# Patient Record
Sex: FEMALE | Race: BLACK OR AFRICAN AMERICAN
[De-identification: names, ages, dates, MRNs, and addresses within clinical notes are randomized per-mention and may not be internally consistent; named-entity substitution may affect disease eponyms.]

---

## 2022-08-03 ENCOUNTER — HOSPITAL ENCOUNTER (OUTPATIENT)
Dept: HOSPITAL 97 - ER | Age: 52
Setting detail: OBSERVATION
LOS: 1 days | Discharge: HOME | End: 2022-08-04
Attending: INTERNAL MEDICINE | Admitting: INTERNAL MEDICINE
Payer: COMMERCIAL

## 2022-08-03 VITALS — BODY MASS INDEX: 59.7 KG/M2

## 2022-08-03 DIAGNOSIS — Z88.6: ICD-10-CM

## 2022-08-03 DIAGNOSIS — I10: ICD-10-CM

## 2022-08-03 DIAGNOSIS — R07.89: Primary | ICD-10-CM

## 2022-08-03 DIAGNOSIS — Z79.899: ICD-10-CM

## 2022-08-03 DIAGNOSIS — Z20.822: ICD-10-CM

## 2022-08-03 DIAGNOSIS — Z90.49: ICD-10-CM

## 2022-08-03 DIAGNOSIS — Z82.49: ICD-10-CM

## 2022-08-03 DIAGNOSIS — E11.40: ICD-10-CM

## 2022-08-03 DIAGNOSIS — E66.01: ICD-10-CM

## 2022-08-03 DIAGNOSIS — E87.6: ICD-10-CM

## 2022-08-03 LAB
BUN BLD-MCNC: 14 MG/DL (ref 7–18)
GLUCOSE SERPLBLD-MCNC: 110 MG/DL (ref 74–106)
HCT VFR BLD CALC: 38.3 % (ref 36–45)
LYMPHOCYTES # SPEC AUTO: 2.8 K/UL (ref 0.7–4.9)
MCV RBC: 87.2 FL (ref 80–100)
NT-PROBNP SERPL-MCNC: 15 PG/ML (ref ?–125)
PMV BLD: 9.5 FL (ref 7.6–11.3)
POTASSIUM SERPL-SCNC: 3.3 MMOL/L (ref 3.5–5.1)
RBC # BLD: 4.39 M/UL (ref 3.86–4.86)
TROPONIN I SERPL HS-MCNC: 3.7 PG/ML (ref ?–58.9)

## 2022-08-03 PROCEDURE — 80053 COMPREHEN METABOLIC PANEL: CPT

## 2022-08-03 PROCEDURE — 85025 COMPLETE CBC W/AUTO DIFF WBC: CPT

## 2022-08-03 PROCEDURE — 80048 BASIC METABOLIC PNL TOTAL CA: CPT

## 2022-08-03 PROCEDURE — 36415 COLL VENOUS BLD VENIPUNCTURE: CPT

## 2022-08-03 PROCEDURE — 93306 TTE W/DOPPLER COMPLETE: CPT

## 2022-08-03 PROCEDURE — 84443 ASSAY THYROID STIM HORMONE: CPT

## 2022-08-03 PROCEDURE — 99285 EMERGENCY DEPT VISIT HI MDM: CPT

## 2022-08-03 PROCEDURE — 83880 ASSAY OF NATRIURETIC PEPTIDE: CPT

## 2022-08-03 PROCEDURE — 85379 FIBRIN DEGRADATION QUANT: CPT

## 2022-08-03 PROCEDURE — 93005 ELECTROCARDIOGRAM TRACING: CPT

## 2022-08-03 PROCEDURE — 80061 LIPID PANEL: CPT

## 2022-08-03 PROCEDURE — 96375 TX/PRO/DX INJ NEW DRUG ADDON: CPT

## 2022-08-03 PROCEDURE — 84484 ASSAY OF TROPONIN QUANT: CPT

## 2022-08-03 PROCEDURE — 71045 X-RAY EXAM CHEST 1 VIEW: CPT

## 2022-08-03 PROCEDURE — 82947 ASSAY GLUCOSE BLOOD QUANT: CPT

## 2022-08-03 PROCEDURE — 84439 ASSAY OF FREE THYROXINE: CPT

## 2022-08-03 PROCEDURE — 96374 THER/PROPH/DIAG INJ IV PUSH: CPT

## 2022-08-03 NOTE — EDPHYS
Physician Documentation                                                                           

 [facility]                                                                 

Name: Rody San                                                                             

Age: 51 yrs                                                                                       

Sex: Female                                                                                       

: 1970                                                                                   

MRN: [id number]                                                                                   

Arrival Date: 2022                                                                          

Time: 20:13                                                                                       

Account#: [id number]                                                                            

Bed 18                                                                                            

Private MD:                                                                                       

ED Physician Bandar Zee                                                                         

HPI:                                                                                              

                                                                                             

20:34 This 51 yrs old Black Female presents to ER via Ambulatory with complaints of Chest     rn  

      Pain.                                                                                       

20:34 The patient or guardian reports chest pain that is located primarily in the anterior    rn  

      chest wall, left. Onset: 1 hour(s) ago. The pain does not radiate. Associated signs and     

      symptoms: Pertinent positives: shortness of breath, Pertinent negatives: abdominal          

      pain, cough, lower extremity swelling. The chest pain is described as squeezing.            

      Duration: The patient or guardian reports multiple episodes, that are intermittent.         

      Modifying factors: The symptoms are alleviated by nothing. the symptoms are aggravated      

      by deep breath.                                                                             

20:35 Severity of pain: At its worst the pain was moderate in the emergency department the    rn  

      pain has improved. The patient has not experienced similar symptoms in the past. The        

      patient has not recently seen a physician. Reports left sided chest pain at rest, had       

      eaten a tuna sandwich, sitting in recliner, denies hx of acid reflux. + family hx of        

      cardiac problems at her age. No recent fever or illness. Feels like pain gets worse         

      with deep breath. Denies abd pain..                                                         

                                                                                                  

OB/GYN:                                                                                           

20:18 LMP N/A - Post-menopause                                                                tw5 

                                                                                                  

Historical:                                                                                       

- Allergies:                                                                                      

20:18 stadol;                                                                                 tw5 

- PMHx:                                                                                           

20:18 Hypertensive disorder; pre-diabetic;                                                    tw 

- PSHx:                                                                                           

20:18 Appendectomy; Cholecystectomy;                                                          tw5 

                                                                                                  

- Immunization history:: Flu vaccine is up to date.                                               

- Social history:: Smoking status: Patient denies any tobacco usage or history of.                

- Family history:: not pertinent.                                                                 

- Hospitalizations: : No recent hospitalization is reported.                                      

                                                                                                  

                                                                                                  

ROS:                                                                                              

20:35 Constitutional: Negative for fever, chills, and weight loss, Eyes: Negative for injury, rn  

      pain, redness, and discharge, Cardiovascular: Negative for palpitations, and edema          

      Respiratory: Negative for cough, wheezing Abdomen/GI: Negative for abdominal pain,          

      nausea, vomiting, diarrhea, and constipation, MS/Extremity: Negative for injury and         

      deformity, Skin: Negative for injury, rash, and discoloration, Neuro: Negative for          

      headache, weakness, numbness, tingling, and seizure.                                        

                                                                                                  

Exam:                                                                                             

20:35 Constitutional:  This is a well developed, well nourished patient who is awake, alert,  rn  

      and in no acute distress. Head/Face:  Normocephalic, atraumatic. Neck:  Trachea             

      midline, no masses palpated Cardiovascular:  Regular rate and rhythm.  No pulse             

      deficits. Respiratory:  No increased work of breathing, no retractions or nasal             

      flaring. Abdomen/GI:  Soft, non-tender Skin:  Warm, dry MS/ Extremity:  Pulses equal,       

      no cyanosis.  Equal circumference.  Neuro:  Awake and alert, GCS 15                         

21:09 ECG was reviewed by the Attending Physician.                                            rn  

                                                                                                  

Vital Signs:                                                                                      

20:17  / 91; Pulse 72; Resp 18; Temp 97.3; Pulse Ox 97% ; Weight 167.38 kg; Height 5    tw5 

      ft. 6 in. (167.64 cm); Pain 9/10;                                                           

21:08  / 55; Pulse 68; Resp 13; Pulse Ox 100% ; Weight 167.38 kg; Height 5 ft. 6 in.    ja4 

      (167.64 cm); Pain 7/10;                                                                     

22:41 Pain 6/10;                                                                              ja4 

                                                                                             

01:44  / 63; Pulse 71; Resp 18; Pulse Ox 100% on R/A;                                   ja4 

                                                                                             

21:08 Body Mass Index 59.56 (167.38 kg, 167.64 cm)                                            ja4 

                                                                                                  

MDM:                                                                                              

                                                                                             

20:17 Patient medically screened.                                                             rn  

22:22 Differential diagnosis: acute myocardial infarction, acute pericarditis,                rn  

      costochondritis, esophagitis, gastritis, gastroesophageal reflux disease (GERD),            

      pericarditis, pleurisy, pneumothorax, pulmonary embolus. Data reviewed: vital signs,        

      nurses notes, lab test result(s), EKG, radiologic studies, plain films, and as a            

      result, I will admit patient. Counseling: I had a detailed discussion with the patient      

      and/or guardian regarding: the historical points, exam findings, and any diagnostic         

      results supporting the discharge/admit diagnosis, lab results, radiology results, the       

      need for further work-up and treatment in the hospital. Response to treatment: the          

      patient's symptoms have mildly improved after treatment, and as a result, I will admit      

      patient. Admission orders: after a detailed discussion of the patient's condition and       

      case, the admit orders are written by me.                                                   

                                                                                                  

                                                                                             

20:17 Order name: Basic Metabolic Panel; Complete Time: 22:22                                 rn  

                                                                                             

20:17 Order name: CBC with Diff; Complete Time: 22:47                                         rn  

                                                                                             

20:17 Order name: NT PRO-BNP; Complete Time: 22:22                                            rn  

                                                                                             

20:17 Order name: Troponin HS; Complete Time: 22:22                                           rn  

                                                                                             

20:31 Order name: D-Dimer; Complete Time: 23:16                                               rn  

                                                                                             

20:31 Order name: SARS-COV-2 RT PCR (Document "Date of Onset" if Symptomatic); Complete Time: rn  

      22:06                                                                                       

                                                                                             

20:17 Order name: XRAY Chest (1 view); Complete Time: 21:37                                   rn  

                                                                                             

05:15 Order name: CBC with Automated Diff                                                     EDMS

                                                                                             

05:28 Order name: Comprehensive Metabolic Panel                                               EDMS

                                                                                             

05:28 Order name: Troponin High Sensitivity                                                   EDMS

                                                                                             

05:28 Order name: Lipid Profile                                                               EDMS

                                                                                             

05:28 Order name: T4 Free                                                                     EDMS

                                                                                             

05:28 Order name: Thyroid Stimulating Hormone                                                 EDMS

                                                                                             

08:33 Order name: Glucose, Ancillary Testing                                                  EDMS

                                                                                             

20:17 Order name: EKG; Complete Time: 20:18                                                   rn  

                                                                                             

20:17 Order name: Cardiac monitoring; Complete Time: 21:30                                    rn  

                                                                                             

20:17 Order name: EKG - Nurse/Tech; Complete Time: 21:03                                      rn  

                                                                                             

20:17 Order name: IV Saline Lock; Complete Time: 21:59                                        rn  

                                                                                             

20:17 Order name: Labs collected and sent; Complete Time: 21:59                               rn  

                                                                                             

20:17 Order name: O2 Per Protocol; Complete Time: 21:02                                       rn  

                                                                                             

20:17 Order name: O2 Sat Monitoring; Complete Time: 21:02                                     rn  

                                                                                                  

EC:10 Rate is 68 beats/min. Rhythm is regular. QRS Axis is Normal. MT interval is normal. QRS rn  

      interval is normal. QT interval is normal. No Q waves. T waves are Normal. No ST            

      changes noted. Clinical impression: NSR w/ Non-specific ST/T Changes. Interpreted by        

      me. Reviewed by me.                                                                         

                                                                                                  

Administered Medications:                                                                         

21:34 Drug: GI Cocktail without Donnatal - (Maalox Suspension 30 ml, Lidocaine Liquid 2 % 15  ja4 

      ml) Route: PO;                                                                              

                                                                                             

08:30 Follow up: Response: No adverse reaction                                                [id number] 

                                                                                             

21:58 Drug: morphine 2 mg Route: IVP; Infused Over: 4 mins; Site: right upper arm;            ja4 

22:41 Follow up: Pain 6/10 Adult                                                              ja4 

21:58 Drug: Zofran (Ondansetron) 4 mg Route: IVP; Site: right upper arm;                      ja4 

                                                                                             

08:30 Follow up: Response: No adverse reaction                                                jg9 

                                                                                                  

                                                                                                  

Disposition Summary:                                                                              

22 22:23                                                                                    

Hospitalization Ordered                                                                           

      Hospitalization Status: Observation                                                     rn  

      Provider: Ernesto eKnnedy rn  

      Condition: Stable                                                                       rn  

      Problem: new                                                                            rn  

      Symptoms: have improved                                                                 rn  

      Bed/Room Type: Standard                                                                 rn  

      Location: [facility] ER HOLD(22 23:07)                                                  cg  

      Room Assignment: ERHOLD-(22 23:07)                                                cg  

      Diagnosis                                                                                   

        - Chest pain, unspecified                                                             rn  

      Forms:                                                                                      

        - Medication Reconciliation Form                                                      rn  

        - SBAR form                                                                           rn  

Signatures:                                                                                       

Dispatcher [employer]                           EDMS                                                 

Bandar Zee MD MD rn Attema, Lee, FNP-C                      FNP-Cla1                                                  

Wilma Hunter RN                       RN   Larissa Omalley                                5                                                  

Devin Garcia RN                       RN   ja4                                                  

Cindy Zuluaga RN   jg9                                                  

                                                                                                  

Corrections: (The following items were deleted from the chart)                                    

                                                                                             

20:19 20:18 Allergies: No Known Allergies; tw5                                                tw5 

21:10 21:09 ECG was reviewed by the Attending Physician. rn                                   rn  

: 22:23 Telemetry/MedSurg (observation) rn                                                sylvia  

: 22:23 rn                                                                                cg  

                                                                                                  

**************************************************************************************************

## 2022-08-03 NOTE — P.HP
Certification for Inpatient


Patient admitted to: Observation


With expected LOS: <2 Midnights


Patient will require the following post-hospital care: None


Practitioner: I am a practitioner with admitting privileges, knowledge of 

patient current condition, hospital course, and medical plan of care.


Services: Services provided to patient in accordance with Admission requirements

found in Title 42 Section 412.3 of the Code of Federal Regulations





<Jostin Cuevas - Last Filed: 08/03/22 23:31>





Patient History


Date of Service: 08/03/22


Reason for admission: Chest pain


History of Present Illness: 





51-year-old female with history of non-insulin-dependent diabetes, hypertension,

obesity presents emergency department for chest pain.  She was evaluated in the 

emergency department her EKG was without STEMI criteria initial troponin 

negative chest x-ray unremarkable D-dimer negative.  She does report that her 

pain began about an hour after eating a tuna sandwich she described the pain as 

pressure/squeezing worse with deep breaths denies previous episodes in the past.

 Has never had any kind of cardiac evaluation denies taking OTC medications for 

GERD in the past.  Will admit under observation for ACS rule out.





- Past Medical/Surgical History


-: Non-insulin-dependent diabetes


-: Hypertension


-: Appendectomy


-: Cholecystectomy


Psychosocial/ Personal History: Patient works as a , lives at home 

with her  and children





- Family History


  ** Father


-: Heart disease





- Social History


Smoking Status: Never smoker


Alcohol use: No


CD- Drugs: No


Caffeine use: Yes


Place of Residence: Home





<Jostin Cuevas - Last Filed: 08/03/22 23:31>


Date of Service: 08/04/22





<Ernesto Kennedy - Last Filed: 08/04/22 18:33>


Allergies





butorphanol [From Stadol] Adverse Reaction (Verified 11/20/17 10:39)


   hallucinations





Home Medications: 








Diclofenac Sodium [Voltaren] 1 tab PO BID 08/04/22 


Gabapentin 150 mg PO DAILY 08/04/22 


Losartan/Hydrochlorothiazide [Losartan-Hctz 50-12.5 mg Tab] 1 tab PO DAILY 

08/04/22 








Review of Systems


10-point ROS is otherwise unremarkable


Cardiovascular: Chest Pain





<Jostin Cuevas - Last Filed: 08/03/22 23:31>





Physical Examination





- Physical Exam


General: Alert, In no apparent distress, Oriented x3, Obese


HEENT: Atraumatic, PERRLA, Mucous membr. moist/pink, EOMI, Sclerae nonicteric


Neck: Supple, 2+ carotid pulse no bruit, No LAD, Without JVD or thyroid 

abnormality


Respiratory: Clear to auscultation bilaterally, Normal air movement


Cardiovascular: Regular rate/rhythm, Normal S1 S2


Gastrointestinal: Normal bowel sounds, No tenderness


Musculoskeletal: No tenderness


Integumentary: No rashes


Neurological: Normal gait, Normal speech, Normal strength at 5/5 x4 extr, Normal

tone, Normal affect


Lymphatics: No axilla or inguinal lymphadenopathy





- Studies


Laboratory Data (last 24 hrs)





08/03/22 21:44: WBC 10.4, Hgb 12.8, Hct 38.3, Plt Count 274


08/03/22 21:44: Sodium 140, Potassium 3.3 L, BUN 14, Creatinine 0.91, Glucose 

110 H








<Jostin Cuevas - Last Filed: 08/03/22 23:31>





- Studies


Laboratory Data (last 24 hrs)





08/03/22 21:44: WBC 10.4, Hgb 12.8, Hct 38.3, Plt Count 274


08/03/22 21:44: Sodium 140, Potassium 3.3 L, BUN 14, Creatinine 0.91, Glucose 

110 H








<Ernesto Kennedy - Last Filed: 08/04/22 18:33>





Assessment and Plan





- Plan


Assessment:


Chest pain rule out ACS


HTN


DM-non insulin dependent 





Plan:


Chest pain rule out ACS: Trend troponin, monitor on tele, cardiology consult. DD

negative. Appreciate further input from cardiology


HTN: Continue home meds .


DM-non insulin dependent: ACHS accucheck, SSI





DVT PPX: Lovenox


Code status: Full


Discharge Plan: Home


Plan to discharge in: 24 Hours





- Advance Directives


Does patient have a Living Will: No


Does patient have a Durable POA for Healthcare: No





- Code Status/Comfort Care


Code Status Assessed: Yes (Full code)


Critical Care: No


Time Spent Managing Pts Care (In Minutes): 70





<Jostin Cuevas - Last Filed: 08/03/22 23:31>


Physician Review: Patient Assessed, Agree with Above Assessment and Plan





<Ernesto Kennedy - Last Filed: 08/04/22 18:33>

## 2022-08-03 NOTE — RAD REPORT
EXAM DESCRIPTION:  RAD - Chest Single View - 8/3/2022 8:53 pm

 

CLINICAL HISTORY:  CHEST PAIN

 

COMPARISON:  March 2009

 

TECHNIQUE:  AP portable chest image was obtained 8/3/2022 8:53 pm .

 

FINDINGS:  No focal mass or consolidation. Significant failure or volume overload are not suspected. 
Mild interstitial edema or infiltrate could be masked. Large body habitus and under penetrated portab
le technique accentuate chest findings.

 

 Heart size is upper normal. Vasculature is mildly prominent. No measurable pleural effusion and no p
neumothorax. No acute bony abnormality seen. No acute aortic findings suspected.

 

IMPRESSION:  No acute cardiopulmonary process.

 

Exam limitations could mask minimal interstitial edema or infiltrate.

## 2022-08-03 NOTE — ER
Nurse's Notes                                                                                     

 [facility]                                                                 

Name: Rody San                                                                             

Age: 51 yrs                                                                                       

Sex: Female                                                                                       

: 1970                                                                                   

MRN: [id number]                                                                                   

Arrival Date: 2022                                                                          

Time: 20:13                                                                                       

Account#: [id number]                                                                            

Bed 18                                                                                            

Private MD:                                                                                       

Diagnosis: Chest pain, unspecified                                                                

                                                                                                  

Presentation:                                                                                     

                                                                                             

20:17 Chief complaint: Patient states: "About an hour ago my chest started hurting bad. It    tw5 

      started off as a 2 or 3 now it is a 9/10". Coronavirus screen: Vaccine status: Patient      

      reports receiving the 2nd dose of the covid vaccine. moderna. Ebola Screen: Patient         

      negative for fever greater than or equal to 101.5 degrees Fahrenheit, and additional        

      compatible Ebola Virus Disease symptoms Patient denies exposure to infectious person.       

      Patient denies travel to an Ebola-affected area in the 21 days before illness onset.        

      Initial Sepsis Screen: Does the patient meet any 2 criteria? No. Patient's initial          

      sepsis screen is negative. Does the patient have a suspected source of infection? No.       

      Patient's initial sepsis screen is negative. Risk Assessment: Do you want to hurt           

      yourself or someone else? Patient reports no desire to harm self or others. Onset of        

      symptoms was 2022 at 19:30.                                                      

20:17 Method Of Arrival: Ambulatory                                                           tw5 

20:17 Acuity: DEEP 2                                                                           tw5 

                                                                                                  

Triage Assessment:                                                                                

20:18 General: Appears uncomfortable, obese, Behavior is calm, cooperative, appropriate for   tw5 

      age. Pain: Complains of pain in chest Pain currently is 9 out of 10 on a pain scale.        

      Quality of pain is described as squeezing. Cardiovascular: Capillary refill < 3 seconds.    

                                                                                                  

OB/GYN:                                                                                           

20:18 LMP N/A - Post-menopause                                                                tw5 

                                                                                                  

Historical:                                                                                       

- Allergies:                                                                                      

20:18 stadol;                                                                                 tw5 

- PMHx:                                                                                           

20:18 Hypertensive disorder; pre-diabetic;                                                    tw5 

- PSHx:                                                                                           

20:18 Appendectomy; Cholecystectomy;                                                          tw5 

                                                                                                  

- Immunization history:: Flu vaccine is up to date.                                               

- Social history:: Smoking status: Patient denies any tobacco usage or history of.                

- Family history:: not pertinent.                                                                 

- Hospitalizations: : No recent hospitalization is reported.                                      

                                                                                                  

                                                                                                  

Screenin:11 Nutritional screening: No deficits noted. Tuberculosis screening: No symptoms or risk   ja4 

      factors identified. Fall Risk None identified.                                              

                                                                                             

12:07 Abuse screen: Denies threats or abuse. Denies injuries from another.                    jg9 

                                                                                                  

Assessment:                                                                                       

                                                                                             

21:08 Pain: Complains of pain in chest Pain does not radiate. Quality of pain is described as ja4 

      pressure, Pain began 2 hours ago. Is intermittent, Aggravated by breathing.                 

      Respiratory: Reports pain with respiration.                                                 

                                                                                                  

Vital Signs:                                                                                      

20:17  / 91; Pulse 72; Resp 18; Temp 97.3; Pulse Ox 97% ; Weight 167.38 kg; Height 5    tw5 

      ft. 6 in. (167.64 cm); Pain 9/10;                                                           

21:08  / 55; Pulse 68; Resp 13; Pulse Ox 100% ; Weight 167.38 kg; Height 5 ft. 6 in.    ja4 

      (167.64 cm); Pain 7/10;                                                                     

22:41 Pain 6/10;                                                                              ja4 

                                                                                             

01:44  / 63; Pulse 71; Resp 18; Pulse Ox 100% on R/A;                                   ja4 

                                                                                             

21:08 Body Mass Index 59.56 (167.38 kg, 167.64 cm)                                            ja4 

                                                                                                  

ED Course:                                                                                        

                                                                                             

20:13 Patient arrived in ED.                                                                  ja2 

20:17 Bandar Zee MD is Attending Physician.                                                rn  

20:18 Triage completed.                                                                       tw5 

20:18 Arm band placed on.                                                                     tw5 

20:55 XRAY Chest (1 view) In Process Unspecified.                                             EDMS

21:08 Devin Garcia, RN is Primary Nurse.                                                     ja4 

21:11 Patient has correct armband on for positive identification. Bed in low position. Side   ja4 

      rails up X2. Client placed on continuous cardiac and pulse oximetry monitoring. NIBP        

      monitoring applied. Cardiac monitor on.                                                     

21:11 No provider procedures requiring assistance completed. Patient maintains SpO2           ja4 

      saturation greater than 95% on room air.                                                    

21:26 SARS-COV-2 RT PCR (Document "Date of Onset" if Symptomatic) Sent.                       ja4 

21:51 Initial lab(s) drawn, by me, sent to lab. Accessed peripheral vein via ultrasound,      bb  

      utilizing dynamic ultrasound technique Powerglide midline 18g 10cm to right upper arm       

      using hospital protocol good blood return and flushes easily pt tolerated well.             

21:58 D-Dimer Sent.                                                                           ja4 

21:59 Basic Metabolic Panel Sent.                                                             ja4 

21:59 CBC with Diff Sent.                                                                     ja4 

21:59 NT PRO-BNP Sent.                                                                        ja4 

21:59 Troponin HS Sent.                                                                       ja4 

22:23 Ernesto Kennedy MD is Hospitalizing Provider.                                            rn  

                                                                                             

12:07 Patient admitted, IV remains in place.                                                  jg9 

                                                                                                  

Administered Medications:                                                                         

                                                                                             

21:34 Drug: GI Cocktail without Donnatal - (Maalox Suspension 30 ml, Lidocaine Liquid 2 % 15  ja4 

      ml) Route: PO;                                                                              

                                                                                             

08:30 Follow up: Response: No adverse reaction                                                jg9 

                                                                                             

21:58 Drug: morphine 2 mg Route: IVP; Infused Over: 4 mins; Site: right upper arm;            ja4 

22:41 Follow up: Pain 6/10 Adult                                                              ja4 

21:58 Drug: Zofran (Ondansetron) 4 mg Route: IVP; Site: right upper arm;                      ja4 

                                                                                             

08:30 Follow up: Response: No adverse reaction                                                jg9 

                                                                                                  

                                                                                                  

Medication:                                                                                       

12:07 VIS not applicable for this client.                                                     jg9 

                                                                                                  

Outcome:                                                                                          

                                                                                             

22:23 Decision to Hospitalize by Provider.                                                    rn  

                                                                                             

12:07 Admitted to ER Hold.  Please see [facility] for further documentation.                    jg9 

12:07 Condition: stable                                                                       jg9 

12:08 Patient left the ED.                                                                    jg9 

                                                                                                  

Signatures:                                                                                       

Dispatcher [employer]                           Ana Laura Li RN                     Bandar Oro MD MD rn Alexander, Jessica ja2 Wood, Tiffany                                tw5                                                  

Cindy Zuluaga RN RN   jg9                                                  

Devin Garcia RN                       RN   ja4                                                  

                                                                                                  

Corrections: (The following items were deleted from the chart)                                    

                                                                                             

20:19 20:18 Allergies: No Known Allergies; tw5                                                tw5 

                                                                                             

08:30 08:30 Response: No adverse reaction jg9                                                 jg9 

                                                                                                  

**************************************************************************************************

## 2022-08-04 VITALS — OXYGEN SATURATION: 100 %

## 2022-08-04 VITALS — DIASTOLIC BLOOD PRESSURE: 63 MMHG | SYSTOLIC BLOOD PRESSURE: 101 MMHG

## 2022-08-04 VITALS — TEMPERATURE: 97.3 F

## 2022-08-04 LAB
ALBUMIN SERPL BCP-MCNC: 3.2 G/DL (ref 3.4–5)
ALP SERPL-CCNC: 59 U/L (ref 45–117)
ALT SERPL W P-5'-P-CCNC: 20 U/L (ref 12–78)
AST SERPL W P-5'-P-CCNC: 12 U/L (ref 15–37)
BUN BLD-MCNC: 13 MG/DL (ref 7–18)
GLUCOSE SERPLBLD-MCNC: 98 MG/DL (ref 74–106)
HCT VFR BLD CALC: 36.5 % (ref 36–45)
HDLC SERPL-MCNC: 41 MG/DL (ref 40–60)
LDLC SERPL CALC-MCNC: 107 MG/DL (ref ?–130)
LYMPHOCYTES # SPEC AUTO: 2.7 K/UL (ref 0.7–4.9)
MCV RBC: 86.8 FL (ref 80–100)
PMV BLD: 9.1 FL (ref 7.6–11.3)
POTASSIUM SERPL-SCNC: 3.3 MMOL/L (ref 3.5–5.1)
RBC # BLD: 4.21 M/UL (ref 3.86–4.86)
TROPONIN I SERPL HS-MCNC: 3.9 PG/ML (ref ?–58.9)
TSH SERPL DL<=0.05 MIU/L-ACNC: 2.07 UIU/ML (ref 0.36–3.74)

## 2022-08-04 RX ADMIN — HUMAN INSULIN SCH: 100 INJECTION, SOLUTION SUBCUTANEOUS at 07:30

## 2022-08-04 RX ADMIN — HUMAN INSULIN SCH: 100 INJECTION, SOLUTION SUBCUTANEOUS at 11:30

## 2022-08-04 NOTE — P.DS
Admission Date: 08/03/22


Discharge Date: 08/04/22


Primary Care Provider: Dr. Deluca


Disposition: ROUTINE DISCHARGE


Discharge Condition: GOOD


Reason for Admission: Chest pain


Consultations: 


1. Cardiology


Hospital Course: 








DIAGNOSES: 


# Chest Pain, suspect Pleurtic


# Type II Diabetes Mellitus complicated by Neuropathy


# Hypertension


# Morbid Obesity - BMI 59.6 kg/m2





HOSPITAL COURSE:


Ms. Rody San is a pleasant 51-year-old female with a past medical history

significant for morbid obesity, type 2 diabetes mellitus, hypertension who was 

admitted to the [facility] on 08/03/2022 for chest pain.





She was admitted to the Medicine service to evaluate for acute coronary 

syndrome. Upon further evaluation, her vital signs and laboratory studies were 

fairly unremarkable. Her troponin trend was 3.7 and 3.9, respectively. Her EKG 

was reportedly without any STEMI criteria. Her chest x-ray revealed, "no acute 

cardiopulmonary process." A transthoracic echocardiogram was performed and 

revealed, "normal 2-dimensional echocardiogram with doppler. No wall motion 

abnormality. No effusion." Cardiology was consulted and she was evaluated by Dr. Reyes. He has cleared her for discharge with a Cardiology follow-up 

appointment.





On 08/04/2022, she was seen on morning rounds and deemed medically stable for 

discharge. She was discharged with instructions to schedule follow-up 

appointments with her PCP (Dr. Deluca) in 3-5 days and with Cardiology (Dr. Reyes) in 5-7 days. She was given the opportunity to ask questions and 

reported no further questions. Furthermore, all questions were answered to the 

best of my ability.





Today, I personally spent 20 minutes with her, of which greater than 50% of the 

time was spent in patient education, counseling, and coordination of care as 

described above.


Vital Signs/Physical Exam: 














Temp Pulse Resp BP Pulse Ox


 


  97.3 F  72  18                     135/91 H  97 


 


  08/04/22 13:02  08/04/22 13:02   08/04/22 13:02  08/04/22 13:02   08/04/22 

13:02








General: Alert, In no apparent distress, Oriented x3


HEENT: Atraumatic, PERRLA, Mucous membr. moist/pink, EOMI, Sclerae nonicteric


Neck: Supple, JVD not distended (difficult to assess given habitus)


Respiratory: Clear to auscultation bilaterally, Normal air movement, Diminished


Cardiovascular: No edema, Regular rate/rhythm, Normal S1 S2, No gallops, No 

rubs, No murmurs


Gastrointestinal: Normal bowel sounds, Soft and benign, Non-distended, No 

tenderness, No rebound, No guarding


Musculoskeletal: No clubbing


Integumentary: No rashes


Neurological: Normal speech, Cranial nerves 3-12 intact, Normal affect


Laboratory Data at Discharge: 














WBC  9.2 K/uL (4.3-10.9)   08/04/22  04:47    


 


Hgb  12.5 g/dL (12.0-15.0)   08/04/22  04:47    


 


Hct  36.5 % (36.0-45.0)   08/04/22  04:47    


 


Plt Count  262 K/uL (152-406)   08/04/22  04:47    


 


Sodium  141 mmol/L (136-145)   08/04/22  04:47    


 


Potassium  3.3 mmol/L (3.5-5.1)  L  08/04/22  04:47    


 


BUN  13 mg/dL (7-18)   08/04/22  04:47    


 


Creatinine  0.85 mg/dL (0.55-1.3)   08/04/22  04:47    


 


Glucose  98 mg/dL ()   08/04/22  04:47    


 


Total Bilirubin  0.3 mg/dL (0.2-1.0)   08/04/22  04:47    


 


AST  12 U/L (15-37)  L  08/04/22  04:47    


 


ALT  20 U/L (12-78)   08/04/22  04:47    


 


Alkaline Phosphatase  59 U/L ()   08/04/22  04:47    


 


Triglycerides  94 mg/dL (<150)   08/04/22  04:47    


 


Cholesterol  167 mg/dL (<200)   08/04/22  04:47    


 


HDL Cholesterol  41 mg/dL (40-60)   08/04/22  04:47    


 


Cholesterol/HDL Ratio  4.07   08/04/22  04:47    








Home Medications: 








Losartan/Hydrochlorothiazide [Losartan-Hctz 50-12.5 mg Tab] 1 tab PO DAILY 

08/04/22 


RX: Diclofenac Sodium [Voltaren] 1 tab PO BID 08/04/22 


RX: Gabapentin 150 mg PO DAILY 08/04/22 





Physician Discharge Instructions: 


1. Please schedule a follow-up with your PCP (Dr. Deluca) in 3-5 days


2. Please schedule a follow-up with Cardiology (Dr. Reyes) in 5-7 days


Diet: AHA


Activity: Ad nini


Followup: 


Jhon Deluca MD [Primary Care Provider] - 


Franklin Reyes MD [ACTIVE - CAN ADMIT] - 


Time spent managing pt's care (in minutes): 20

## 2022-08-04 NOTE — ECHO
HEIGHT: 5 ft 6 in   WEIGHT: 370 lb 0 oz   DATE OF STUDY: 08/04/2022   REFER DR: Franklin Reyes MD

2-DIMENSIONAL: YES

     M.MODE: YES

 DOPPLER: YES

COLOR FLOW: YES



                    TDS:  

PORTABLE: YES

 DEFINITY:  

BUBBLE STUDY: 





DIAGNOSIS:  CHEST PAIN



CARDIAC HISTORY:  

CATHERIZATION: NO

SURGERY: NO

PROSTHETIC VALVE: NO

PACEMAKER: NO





MEASUREMENTS (cm)

    DIASTOLIC (NORMALS)                 SYSTOLIC (NORMALS)

IVSd                 1.1 (0.6-1.2)                    LA Diam 2.4 (1.9-4.0)     LVEF       
  66%  

LVIDd               4.9 (3.5-5.7)                        LVIDs      3.1 (2.0-3.5)     %FS  
        36%

LVPWd             1.2 (0.6-1.2)

Ao Diam           2.8 (2.0-3.7)



2 DIMENSIONAL ASSESSMENT:

RIGHT ATRIUM:                   NORMAL

LEFT ATRIUM:       NORMAL



RIGHT VENTRICLE:            NORMAL

LEFT VENTRICLE: NORMAL



TRICUSPID VALVE:             NORMAL

MITRAL VALVE:     NORMAL



PULMONIC VALVE:             NORMAL

AORTIC VALVE:     NORMAL



PERICARDIAL EFFUSION: NONE

AORTIC ROOT:      NORMAL





LEFT VENTRICULAR WALL MOTION:     NORMAL



DOPPLER/COLOR FLOW:     NORMAL



COMMENTS:      NORMAL 2-DIMENSIONAL ECHOCARDIOGRAM WITH DOPPLER.  

                            NO WALL MOTION ABNORMALITY.  NO EFFUSION.





TECHNOLOGIST:   BAKARI PEACOCK.

## 2022-08-04 NOTE — EKG
Test Date:    2022-08-03               Test Time:    20:39:02

Technician:   MARCELLA                                     

                                                     

MEASUREMENT RESULTS:                                       

Intervals:                                           

Rate:         68                                     

AK:           178                                    

QRSD:         94                                     

QT:           388                                    

QTc:          412                                    

Axis:                                                

P:            58                                     

AK:           178                                    

QRS:          -3                                     

T:            85                                     

                                                     

INTERPRETIVE STATEMENTS:                                       

                                                     

Normal sinus rhythm

Low voltage QRS

Borderline ECG

Compared to ECG 03/23/2009 22:21:34

Low QRS voltage now present

Prolonged QT interval no longer present



Electronically Signed On 08-04-22 10:31:20 CDT by Franklin Reyes

## 2022-08-05 NOTE — CON
Date of Consultation:  08/04/2022



Reason For Consultation:  Chest pain.



History Of Present Illness:  Ms. San is a 51-year-old black woman with history of hypertension a
nd prediabetes.  No previous cardiac history.  Follows up with Dr. Dempsey's office for her hypertension
.  Takes Norvasc and losartan with hydrochlorothiazide.  Came in with atypical chest pain, left-sided
 anterior sharp, stabbing, worse with inspiration.  Denied PND, orthopnea, pedal edema, palpitations,
 or syncope.  Denied any fever or chills.  Denied any nausea or vomiting or diaphoresis.  Her EKG was
 normal.  Ejection fraction was normal.  BNP and troponin were normal.  Her potassium was 3.3.



Past Medical History:  As stated above.



Allergies:  SHE IS ALLERGIC TO BUTORPHANOL.



Medications:  At home were listed earlier.



Review of Systems:

Negative.



Social History:  Negative.



Family History:  She has had a history of valve replacement and coronary artery disease in her family
.



Physical Examination:

Vital Signs:  Ms. San weighed 370 pounds.  No acute distress.  Vital signs stable, afebrile, sin
us rhythm. 

HEENT:  Negative. 

Neck:  Supple.  No bruit. 

Chest:  Clear. 

Cardiac:  Normal. 

Abdomen:  Obese, but benign. 

Extremities:  Revealed no clubbing, cyanosis, or edema.



Diagnostic Data:  As stated earlier.



Impression And Plan:  

1.Hypertension.

2.Prediabetes.

3.Obesity.

4.Hypokalemia.

5.Atypical chest pain.

6.Family history of heart disease. 

Ms. San' weight makes it impossible to do a stress test on her.  I think if she rules in, we ibrahima
l perform a heart catheterization via wrist approach.  If she rules out, I think she may have an echo
cardiogram done today and it this is normal, she can go home.  Her potassium needs to be supplemented
.  I think her chest pain is more pleuritic than cardiac.





NB/MODL

DD:  08/05/2022 09:59:08Voice ID:  [id number]

DT:  08/05/2022 14:25:48Report ID:  [id number]

## 2024-11-07 NOTE — XMS REPORT
Continuity of Care Document

                            Created on:August 3, 2022



Patient:FLOR PEACOCK

Sex:Female

:1970

External Reference #:[id number]





Demographics







                          Address                   [address] [place] [address]

 

                          Home Phone                [phone]

 

                          Work Phone                [phone]

 

                          Mobile Phone              [phone]

 

                          Email Address             [email or web address]

 

                          Preferred Language        English

 

                          Marital Status            Unknown

 

                          Amish Affiliation     Unknown

 

                          Race                      Unknown

 

                          Additional Race(s)        Unavailable

 

                          Ethnic Group              Unknown









Author







                          Organization              [facility]

t

 

                          Address                   [address]



                                                    [place] [address]

 

                          Phone                     [phone]









Care Team Providers







                    Name                Role                Phone

 

                    PCP, PATIENT DOES NOT HAVE A Primary Care Physician Unavaila

PATSY Washington   Attending Clinician Unavailable

 

                    Only, Ang Db Test   Attending Clinician Unavailable

 

                    Patsy Butler Attending Clinician [phone]

 

                    Doctor Unassigned, No Name Attending Clinician Unavailable

 

                    UNKNOWN, ATTENDING  Attending Clinician Unavailable









Payers







           Payer Name Policy Type Policy Number Effective Date Expiration Date S

roger

 

           HIM JUAN FRANCISCOR FROM            [id number] 2022            



           [facility]                       00:00:00              







Problems

This patient has no known problems.



Allergies, Adverse Reactions, Alerts







       Allergy Allergy Status Severity Reaction(s) Onset  Inactive Treating Comm

ents 

Source



       Name   Type                        Date   Date   Clinician        

 

       NO KNOWN Drug   Active                                           [facility]



       ALLERGIE Class                                                   ity of



       [facility]







Social History







           Social Habit Start Date Stop Date  Quantity   Comments   Source

 

           Exposure to                       Yes                   [facility]

 [place]



           SARS-CoV-2 (event)                                             [facility]

[facility]

 

           Sex Assigned At [birth year]                       [facility]



           Birth      00:00:00   00:00:00                         [facility]









                Smoking Status  Start Date      Stop Date       Source

 

                Unknown if ever smoked                                 [facility]







Medications

This patient has no known medications.



Procedures







                Procedure       Date / Time Performed Performing Clinician [facility]

e

 

                CONSENT/REFUSAL FOR 2022 15:24:52 Doctor Unassigned, No [facility]



                DIAGNOSIS AND                   Name            Medical Branch



                TREATMENT                                       

 

                ASSIGNMENT OF BENEFITS 2022 15:24:40 Doctor Unassigned, No

 [facility]







Encounters







        Start   End     Encounter Admission Attending Care    Care    Encounter 

Source



        Date/Time Date/Time Type    Type    Clinicians Facility Department ID   

   

 

        2022 Outpatient R       JOE [facility]    [id number]

[id number] [facility]



        09:15:00 09:31:45                 PATSY molina o

f



                                                                        [facility]

 

        2022 Laboratory         Only, Ang Db Test [facility]    1.2.8

40.114 [id number] 

[facility]



        09:15:00 09:30:00 Only            Patsy Chambers [facility]  350.1.13.10 

        ity of



                                                [place] 4.2.7.2.686         Moe

as



                                                SINDY?BLEA [phone]         [address]



                                                MEDICAL                 



                                                OFFICE                  



                                                BUILDING                 

 

        2022 Orders          Doctor  DANIELA    1.2.840.114 [id number]

13 [facility]



        00:00:00 00:00:00 Only            Unassigned, NICHOLAS   350.1.13.10       

  ity of



                                        [facility] 4.2.7.2.686         Moe

as



                                                        [phone]         [address]

 

        2022 Outpatient R       RIO, [facility]    [id number]

[id number] [facility]



        16:00:00 16:00:00                 ATTENDING                         ity 

of



                                                                        [facility]







Results

This patient has no known results. [de-identified] :  I, Dr. Hernandez personally performed the evaluation and management services for this established patient who presents today with (a) new problem(s)/exacerbation of (an) existing condition(s). That E/M includes conducting the clinically appropriate interval history &/or exam, assessing all new/exacerbated conditions, and establishing a new plan of care. Today, my ALAN, Alejandro Noel was here to observe my evaluation and management service for this new problem/exacerbated condition and follow the plan of care established by me going forward.

## 2025-03-02 ENCOUNTER — HOSPITAL ENCOUNTER (EMERGENCY)
Dept: HOSPITAL 97 - ER | Age: 55
Discharge: HOME | End: 2025-03-02
Payer: COMMERCIAL

## 2025-03-02 VITALS — OXYGEN SATURATION: 100 % | TEMPERATURE: 97.8 F | SYSTOLIC BLOOD PRESSURE: 121 MMHG | DIASTOLIC BLOOD PRESSURE: 65 MMHG

## 2025-03-02 DIAGNOSIS — L02.411: Primary | ICD-10-CM

## 2025-03-02 PROCEDURE — 99282 EMERGENCY DEPT VISIT SF MDM: CPT

## 2025-03-02 NOTE — XMS REPORT
Continuity of Care Document



                            Created on: 2025





FLOR PEACOCK

External Reference #: [id number]

: 1970

Sex: Female



Demographics





                                        Address             [address] [place]  [address]

 

                                        Home Phone          [phone]

 

                                        Work Phone          [phone]

 

                                        Mobile Phone        [phone]

 

                                        Email Address       [email or web address]

 

                                        Preferred Language  English

 

                                        Marital Status      Unknown

 

                                        Nondenominational Affiliation Unknown

 

                                        Race                Unknown

 

                                        Additional Race(s)  Unavailable

 

                                        Ethnic Group        Unknown





Author





                                        Name                Unknown

 

                                        Address             [address]

[address]

[place] [id number]

 

                                        [facility]

[employer]

 

                                        Address             [address]

[address]

[place]  [address]

 

                                        Phone               [phone]





Care Team Providers





                                Care Team Member Name Role            Phone

 

                                PCP, PATIENT DOES NOT HAVE A Primary Care Physic

maximilian Unavailable

 

                                PATSY DIAZ Attending Clinician Robin

e

 

                                Only, Ang Db Test Attending Clinician Patsy Onofre Attending Clinician [phone]

 

                                Doctor Unassigned, No Name Attending Clinician U

navailable

 

                                UNKNOWN, ATTENDING Attending Clinician Unavailab

le







Payers





                    Payer Name Policy Type Policy Number Effective Date Expirati

on Date Source

 

                                                    HIM PIEDAD FROM 

[facility]                         [id number]         2022 

00:00:00                                            







Allergies, Adverse Reactions, Alerts





                                                    Allergy 

Name                                    Allergy 

Type            Status          Severity        Reaction(s)     Onset 

Date                                    Inactive 

Date                                    Treating 

Clinician                 Comments                  Source

 

                                                    NO KNOWN 

ALLERGIE

S                                       Drug 

Class   Active                                                  [facility]







Social History





                    Social Habit Start Date Stop Date Quantity  Comments  Source

 

                                                    Exposure to 

SARS-CoV-2 (event)                           Yes                       [facility]

 

                                                    Sex Assigned At 

Birth                                   1970 

00:00:00                                1970 

00:00:00                                                    [facility]







                          Smoking Status Start Date   Stop Date    Source

 

                          Unknown if ever smoked                           [facility]

[facility]







Procedures





                          Procedure    Date / Time Performed Performing Clinicia

n Source

 

                                                    CONSENT/REFUSAL FOR 

DIAGNOSIS AND 

TREATMENT                 2022 15:24:52       Doctor Unassigned, No 

Name                                    [facility]

 

                                ASSIGNMENT OF BENEFITS 2022 15:24:40 Docto

r Unassigned, No 

Name                                    [facility]







Encounters





                                                    Start 

Date/Time                               End 

Date/Time                               Encounter 

Type                                    Admission 

Type                                    Attending 

Clinicians                              Care 

Facility                                Care 

Department                              Encounter 

ID                                      Source

 

                                                    2022 

09:15:00                                2022 

09:31:45            Outpatient          R                   PATSY DIAZ        [facility]            [id number]      [facility]

 

                                                    2022 

09:15:00                                2022 

09:30:00                                Laboratory 

Only                                                Only, Ang 

Db Test

[facility] 

[facility] 

OFF[facility] 

BUILDING                                1.2.840.114

350.1.13.10

[id number]

[phone]

370                       [id number]                  [facility]

 

                                                    2022 

00:00:00                                2022 

00:00:00                                Orders 

Only                                                Doctor 

Unassigned, 

No Name                                 [facility]                                1.2.840.114

350.1.13.10

[id number]

[phone]

[id number]                       [id number]                  [facility]

 

                                                    2022 

16:00:00                                2022 

16:00:00            Outpatient          R                   UNKNOWN, 

ATTENDING       [facility]            [id number]      [facility]

## 2025-03-02 NOTE — ER
Nurse's Notes                                                                                     

 [facility]                                                                 

Name: Rody San                                                                             

Age: 54 yrs                                                                                       

Sex: Female                                                                                       

: 1970                                                                                   

MRN: [id number]                                                                                   

Arrival Date: 2025                                                                          

Time: 16:44                                                                                       

Account#: [id number]                                                                            

Bed IW1                                                                                           

Private MD:                                                                                       

Diagnosis: Cutaneous abscess of right axilla                                                      

                                                                                                  

Presentation:                                                                                     

                                                                                             

16:58 Chief complaint: Patient states: abscess in right armpit since Friday, has been         iw  

      draining. Coronavirus screen: At this time, the client does not indicate any symptoms       

      associated with coronavirus-19. Ebola Screen: No symptoms or risks identified at this       

      time. Initial Sepsis Screen: Does the patient meet any 2 criteria? No. Patient's            

      initial sepsis screen is negative. Does the patient have a suspected source of              

      infection? No. Patient's initial sepsis screen is negative. Risk Assessment: Do you         

      want to hurt yourself or someone else? Patient reports no desire to harm self or            

      others. Onset of symptoms was 2025.                                            

16:58 Method Of Arrival: Ambulatory                                                           iw  

16:58 Acuity: DEEP 4                                                                           iw  

                                                                                                  

Triage Assessment:                                                                                

17:01 General: Appears in no apparent distress. Behavior is calm, cooperative. Pain:          iw  

      Complains of pain in right axilla Pain currently is 2 out of 10 on a pain scale.            

                                                                                                  

Historical:                                                                                       

- Allergies:                                                                                      

17:00 Stadol;                                                                                 iw  

- PMHx:                                                                                           

17:00 pre-diabetic; Hypertensive disorder;                                                    iw  

- PSHx:                                                                                           

17:00 Appendectomy; Cholecystectomy;                                                          iw  

                                                                                                  

- Immunization history:: Adult Immunizations up to date, .                                        

- Infectious Disease History:: Denies.                                                            

- Social history:: Smoking status: Patient denies any tobacco usage or history of.                

                                                                                                  

                                                                                                  

Vital Signs:                                                                                      

16:58  / 65; Pulse 73; Resp 18; Temp 97.8; Pulse Ox 100% on R/A; Weight 129.27 kg;      iw  

      Height 5 ft. 7 in. ; Pain 2/10;                                                             

16:58 Body Mass Index 44.64 (129.27 kg, 170.18 cm)                                            iw  

16:58 Pain Scale: Adult                                                                       iw  

                                                                                                  

ED Course:                                                                                        

16:47 Patient arrived in ED.                                                                  im  

16:49 [facility]                                                        kb  

16:49 Joe Breaux MD is Attending Physician.                                            kb  

16:59 Triage completed.                                                                       iw  

17:00 Arm band placed on.                                                                     iw  

17:05 Cathy San, RN is Primary Nurse.                                                   iw  

                                                                                                  

Administered Medications:                                                                         

17:12 Drug: Trimethoprim-Sulfamethoxazole PO (160 mg-800 mg (DS) 1 tablet PO once Route: PO;  iw  

17:12 Drug: Cephalexin  mg PO once Route: PO;                                           iw  

                                                                                                  

                                                                                                  

Outcome:                                                                                          

17:01 Discharge ordered by MD. kirkland  

17:12 Patient left the ED.                                                                    iw  

                                                                                                  

Signatures:                                                                                       

Anu Laura, LUISP-C                 FNP-Ckb                                                   

Cathy San, RN                     RN   iw                                                   

Kamla Ordonez                                                                                  

                                                                                                  

Corrections: (The following items were deleted from the chart)                                    

17:00 16:58 Pulse 73bpm; Resp 18bpm; Pulse Ox 100% RA; Temp 97.8F; 129.27 kg; Height 5 ft. 7  iw  

      in.; BMI: 44.6; Pain 2/10, Adult; iw                                                        

                                                                                                  

**************************************************************************************************

## 2025-03-02 NOTE — EDPHYS
Physician Documentation                                                                           

 [facility]                                                                 

Name: Rody San                                                                             

Age: 54 yrs                                                                                       

Sex: Female                                                                                       

: 1970                                                                                   

MRN: [id number]                                                                                   

Arrival Date: 2025                                                                          

Time: 16:44                                                                                       

Account#: [id number]                                                                            

Bed IW1                                                                                           

Private MD:                                                                                       

ED Physician Joe Breaux                                                                     

HPI:                                                                                              

                                                                                             

17:01 This 54 yrs old Black Female presents to ER via Ambulatory with complaints of Cyst -    kb  

      right armpit.                                                                               

17:01 Pt is a 54 year old female who presents for abscess to right armpit that she noticed on kb  

      Friday. States it started draining today. Denies fever. .                                   

                                                                                                  

Historical:                                                                                       

- Allergies:                                                                                      

17:00 Stadol;                                                                                 iw  

- PMHx:                                                                                           

17:00 pre-diabetic; Hypertensive disorder;                                                    iw  

- PSHx:                                                                                           

17:00 Appendectomy; Cholecystectomy;                                                          iw  

                                                                                                  

- Immunization history:: Adult Immunizations up to date, .                                        

- Infectious Disease History:: Denies.                                                            

- Social history:: Smoking status: Patient denies any tobacco usage or history of.                

                                                                                                  

                                                                                                  

ROS:                                                                                              

17:00 Constitutional: As per HPI                                                              kb  

                                                                                                  

Exam:                                                                                             

17:00 Constitutional:  This is a well developed, well nourished patient who is awake, alert,  kb  

      and in no acute distress. Head/Face:  Normocephalic, atraumatic. ENT:  Moist Mucous         

      membranes Cardiovascular:  Regular rate  Respiratory:  Respirations even and unlabored.     

      No increased work of breathing. Talking in full sentences MS/ Extremity:  Pulses equal,     

      no cyanosis.  Neurovascular intact.  Full, normal range of motion. Neuro:  Awake and        

      alert, GCS 15, oriented to person, place, time, and situation.                              

17:00 Skin: abscess, that is moderate sized, of the right axilla, with drainage, that is          

      purulent, with induration,                                                                  

                                                                                                  

Vital Signs:                                                                                      

16:58  / 65; Pulse 73; Resp 18; Temp 97.8; Pulse Ox 100% on R/A; Weight 129.27 kg;      iw  

      Height 5 ft. 7 in. ; Pain 2/10;                                                             

16:58 Body Mass Index 44.64 (129.27 kg, 170.18 cm)                                            iw  

16:58 Pain Scale: Adult                                                                       iw  

                                                                                                  

MDM:                                                                                              

16:49 Medical Screening Exam initiated                                                        kb  

17:00 Differential diagnosis: abscess, cellulitis, insect bite. Data reviewed: vital signs,   kb  

      nurses notes. Counseling: I had a detailed discussion with the patient and/or guardian      

      regarding the historical points, exam findings, and any diagnostic results supporting       

      the discharge/admit diagnosis, the need for outpatient follow up, a family                  

      practitioner, to return to the emergency department if symptoms worsen or persist or if     

      there are any questions or concerns that arise at home.                                     

                                                                                                  

Administered Medications:                                                                         

17:12 Drug: Trimethoprim-Sulfamethoxazole PO (160 mg-800 mg (DS) 1 tablet PO once Route: PO;  iw  

17:12 Drug: Cephalexin  mg PO once Route: PO;                                           iw  

                                                                                                  

                                                                                                  

Disposition:                                                                                      

19:09 Co-signature as Attending Physician, Joe Breaux MD I reviewed the patient's care   rt  

      provided by the Advanced Practice Provider and agree with the diagnosis and treatment       

      plan.                                                                                       

                                                                                                  

Disposition Summary:                                                                              

25 17:01                                                                                    

Discharge Ordered                                                                                 

 Notes:       Location: Home                                                                        
  kb

      Condition: Stable                                                                       kb  

      Diagnosis                                                                                   

        - Cutaneous abscess of right axilla                                                   kb  

      Followup:                                                                               kb  

        - With: Emergency Department                                                               

        - When: As needed                                                                          

        - Reason: Worsening of condition                                                           

      Followup:                                                                               kb  

        - With: Private Physician                                                                  

        - When: 2 - 3 days                                                                         

        - Reason: Recheck today's complaints, Continuance of care, Re-evaluation by your           

      physician                                                                                   

      Discharge Instructions:                                                                     

        - Discharge Summary Sheet                                                             kb  

        - Skin Abscess, Easy-to-Read                                                          kb  

      Forms:                                                                                      

        - Medication Reconciliation Form                                                      kb  

        - Antibiotic Education                                                                kb  

        - Prescription Opioid Use                                                             kb  

        - Patient Portal Instructions                                                         kb  

        - Leadership Thank You Letter                                                         kb  

      Prescriptions:                                                                              

        - Cephalexin 500 mg Oral Capsule                                                           

            - take 1 capsule ORAL route every 8 hours for 10 days; 30 capsule; Refills: 0,    kb  

      Product Selection Permitted                                                                 

        - Bactrim -160 mg Oral Tablet                                                        

            - take 1 tablet ORAL route every 12 hours for 10 days; 20 tablet; Refills: 0,     kb  

      Product Selection Permitted                                                                 

Signatures:                                                                                       

Anu Laura FNP-C FNP-Catyh Serrano, BRITTANIE                     RN   iw                                                   

Joe Breaux MD MD   rt                                                   

                                                                                                  

**************************************************************************************************